# Patient Record
Sex: FEMALE | Race: WHITE | Employment: PART TIME | URBAN - METROPOLITAN AREA
[De-identification: names, ages, dates, MRNs, and addresses within clinical notes are randomized per-mention and may not be internally consistent; named-entity substitution may affect disease eponyms.]

---

## 2018-05-08 ENCOUNTER — EMPLOYEE WELLNESS (OUTPATIENT)
Dept: OTHER | Age: 58
End: 2018-05-08

## 2018-05-08 LAB
CHOLESTEROL, TOTAL: 239 MG/DL (ref 0–199)
GLUCOSE BLD-MCNC: 90 MG/DL (ref 74–109)
HDLC SERPL-MCNC: 91 MG/DL (ref 40–59)
LDL CHOLESTEROL CALCULATED: 136 MG/DL (ref 0–129)
TRIGL SERPL-MCNC: 60 MG/DL (ref 0–200)

## 2018-05-14 VITALS — WEIGHT: 116 LBS

## 2019-10-03 ENCOUNTER — HOSPITAL ENCOUNTER (OUTPATIENT)
Dept: ULTRASOUND IMAGING | Age: 59
Discharge: HOME | End: 2019-10-05
Payer: COMMERCIAL

## 2019-10-03 DIAGNOSIS — D13.4 BENIGN NEOPLASM OF LIVER: ICD-10-CM

## 2019-10-03 PROCEDURE — 76705 ECHO EXAM OF ABDOMEN: CPT

## 2020-03-04 ENCOUNTER — OFFICE VISIT (OUTPATIENT)
Dept: FAMILY MEDICINE CLINIC | Age: 60
End: 2020-03-04
Payer: COMMERCIAL

## 2020-03-04 VITALS
DIASTOLIC BLOOD PRESSURE: 82 MMHG | HEIGHT: 61 IN | BODY MASS INDEX: 21.67 KG/M2 | TEMPERATURE: 99.1 F | RESPIRATION RATE: 15 BRPM | WEIGHT: 114.8 LBS | SYSTOLIC BLOOD PRESSURE: 122 MMHG | OXYGEN SATURATION: 97 % | HEART RATE: 95 BPM

## 2020-03-04 PROCEDURE — 99213 OFFICE O/P EST LOW 20 MIN: CPT | Performed by: NURSE PRACTITIONER

## 2020-03-04 PROCEDURE — 3017F COLORECTAL CA SCREEN DOC REV: CPT | Performed by: NURSE PRACTITIONER

## 2020-03-04 PROCEDURE — 1036F TOBACCO NON-USER: CPT | Performed by: NURSE PRACTITIONER

## 2020-03-04 PROCEDURE — G8427 DOCREV CUR MEDS BY ELIG CLIN: HCPCS | Performed by: NURSE PRACTITIONER

## 2020-03-04 PROCEDURE — G8484 FLU IMMUNIZE NO ADMIN: HCPCS | Performed by: NURSE PRACTITIONER

## 2020-03-04 PROCEDURE — G8420 CALC BMI NORM PARAMETERS: HCPCS | Performed by: NURSE PRACTITIONER

## 2020-03-04 RX ORDER — AMOXICILLIN AND CLAVULANATE POTASSIUM 875; 125 MG/1; MG/1
1 TABLET, FILM COATED ORAL 2 TIMES DAILY
Qty: 20 TABLET | Refills: 0 | Status: SHIPPED | OUTPATIENT
Start: 2020-03-04 | End: 2020-03-04

## 2020-03-04 RX ORDER — TRETINOIN 0.5 MG/G
CREAM TOPICAL
COMMUNITY
Start: 2018-09-26

## 2020-03-04 RX ORDER — AMOXICILLIN AND CLAVULANATE POTASSIUM 875; 125 MG/1; MG/1
1 TABLET, FILM COATED ORAL 2 TIMES DAILY
Qty: 20 TABLET | Refills: 0 | Status: SHIPPED | OUTPATIENT
Start: 2020-03-04 | End: 2020-03-14

## 2020-03-04 RX ORDER — SULFAMETHOXAZOLE AND TRIMETHOPRIM 800; 160 MG/1; MG/1
TABLET ORAL
COMMUNITY
Start: 2020-01-13

## 2020-03-04 RX ORDER — ERGOCALCIFEROL 1.25 MG/1
CAPSULE ORAL
COMMUNITY
Start: 2020-02-26

## 2020-03-04 SDOH — HEALTH STABILITY: MENTAL HEALTH: HOW MANY STANDARD DRINKS CONTAINING ALCOHOL DO YOU HAVE ON A TYPICAL DAY?: 1 OR 2

## 2020-03-04 SDOH — HEALTH STABILITY: MENTAL HEALTH: HOW OFTEN DO YOU HAVE A DRINK CONTAINING ALCOHOL?: MONTHLY OR LESS

## 2020-03-04 ASSESSMENT — ENCOUNTER SYMPTOMS
TROUBLE SWALLOWING: 0
SHORTNESS OF BREATH: 0
SINUS PRESSURE: 0
SORE THROAT: 0
COLOR CHANGE: 0
EYE DISCHARGE: 0
RHINORRHEA: 0
COUGH: 0
WHEEZING: 0
SINUS PAIN: 0
EYE PAIN: 0

## 2020-03-04 ASSESSMENT — PATIENT HEALTH QUESTIONNAIRE - PHQ9
1. LITTLE INTEREST OR PLEASURE IN DOING THINGS: 0
SUM OF ALL RESPONSES TO PHQ QUESTIONS 1-9: 0
2. FEELING DOWN, DEPRESSED OR HOPELESS: 0
SUM OF ALL RESPONSES TO PHQ QUESTIONS 1-9: 0
SUM OF ALL RESPONSES TO PHQ9 QUESTIONS 1 & 2: 0

## 2020-03-04 NOTE — PROGRESS NOTES
Subjective:      Patient ID: Cleo Hannon is a 63 y.o. female who presents today for:  Chief Complaint   Patient presents with    Otalgia     Patient present today with C/O left shooting ear pain. Paitent states that this has been going on for a couple of hours and has showed no signs of relife. Patient has tried rubbing alcohol with no relfie. Otalgia    There is pain in the left ear. This is a new problem. The current episode started today. The problem occurs constantly. The problem has been unchanged. The maximum temperature recorded prior to her arrival was 100.4 - 100.9 F. The fever has been present for less than 1 day. The pain is at a severity of 6/10. The pain is moderate. Associated symptoms include neck pain. Pertinent negatives include no coughing, ear discharge, rash, rhinorrhea or sore throat. She has tried nothing for the symptoms. No past medical history on file. No past surgical history on file.   Social History     Socioeconomic History    Marital status:      Spouse name: Not on file    Number of children: Not on file    Years of education: Not on file    Highest education level: Not on file   Occupational History    Not on file   Social Needs    Financial resource strain: Not on file    Food insecurity:     Worry: Not on file     Inability: Not on file    Transportation needs:     Medical: Not on file     Non-medical: Not on file   Tobacco Use    Smoking status: Never Smoker    Smokeless tobacco: Never Used   Substance and Sexual Activity    Alcohol use: Yes     Frequency: Monthly or less     Drinks per session: 1 or 2     Binge frequency: Never    Drug use: Never    Sexual activity: Not Currently   Lifestyle    Physical activity:     Days per week: Not on file     Minutes per session: Not on file    Stress: Not on file   Relationships    Social connections:     Talks on phone: Not on file     Gets together: Not on file     Attends [employer] service: Not on file     Active member of club or organization: Not on file     Attends meetings of clubs or organizations: Not on file     Relationship status: Not on file    Intimate partner violence:     Fear of current or ex partner: Not on file     Emotionally abused: Not on file     Physically abused: Not on file     Forced sexual activity: Not on file   Other Topics Concern    Not on file   Social History Narrative    Not on file     No family history on file. No Known Allergies      Review of Systems   Constitutional: Negative for activity change, appetite change, chills and fever. HENT: Positive for ear pain and postnasal drip. Negative for congestion, ear discharge, rhinorrhea, sinus pressure, sinus pain, sore throat and trouble swallowing. Eyes: Negative for pain and discharge. Respiratory: Negative for cough, shortness of breath and wheezing. Cardiovascular: Negative for chest pain and palpitations. Musculoskeletal: Positive for neck pain. Skin: Negative for color change and rash. Hematological: Negative for adenopathy. Objective:   /82   Pulse 95   Temp 99.1 °F (37.3 °C) (Temporal)   Resp 15   Ht 5' 1\" (1.549 m)   Wt 114 lb 12.8 oz (52.1 kg)   SpO2 97%   BMI 21.69 kg/m²     Physical Exam  Vitals signs reviewed. Constitutional:       General: She is not in acute distress. Appearance: Normal appearance. HENT:      Head: Normocephalic and atraumatic. Right Ear: Tympanic membrane is injected. Left Ear: A middle ear effusion is present. Tympanic membrane is injected. Nose: Nose normal.      Mouth/Throat:      Lips: Pink. Mouth: Mucous membranes are moist.      Pharynx: Posterior oropharyngeal erythema present. Eyes:      Conjunctiva/sclera: Conjunctivae normal.   Cardiovascular:      Rate and Rhythm: Normal rate and regular rhythm. Pulses: Normal pulses. Heart sounds: Normal heart sounds.    Pulmonary:      Effort: Pulmonary effort is normal. Breath sounds: Normal breath sounds. No wheezing, rhonchi or rales. Skin:     General: Skin is warm and dry. Capillary Refill: Capillary refill takes less than 2 seconds. Neurological:      Mental Status: She is alert. Assessment:       Diagnosis Orders   1. Non-recurrent acute suppurative otitis media of left ear without spontaneous rupture of tympanic membrane  amoxicillin-clavulanate (AUGMENTIN) 875-125 MG per tablet    DISCONTINUED: amoxicillin-clavulanate (AUGMENTIN) 875-125 MG per tablet      No results found for this visit on 03/04/20. Plan:   When should you call for help? Call your doctor now or seek immediate medical care if:    · You have new or increasing ear pain. · You have new or increasing pus or blood draining from your ear. · You have a fever with a stiff neck or a severe headache. Watch closely for changes in your health, and be sure to contact your doctor if:    · You have new or worse symptoms. · You are not getting better after taking an antibiotic for 2 days. Discussed with patient/family worsening signs and symptoms as outlined above as to when to return for care/seek emergent help. Assessment & Plan   Sher Kelly was seen today for otalgia. Diagnoses and all orders for this visit:    Non-recurrent acute suppurative otitis media of left ear without spontaneous rupture of tympanic membrane  -     Discontinue: amoxicillin-clavulanate (AUGMENTIN) 875-125 MG per tablet; Take 1 tablet by mouth 2 times daily for 10 days  -     amoxicillin-clavulanate (AUGMENTIN) 875-125 MG per tablet; Take 1 tablet by mouth 2 times daily for 10 days      No orders of the defined types were placed in this encounter.     Orders Placed This Encounter   Medications    DISCONTD: amoxicillin-clavulanate (AUGMENTIN) 875-125 MG per tablet     Sig: Take 1 tablet by mouth 2 times daily for 10 days     Dispense:  20 tablet     Refill:  0    amoxicillin-clavulanate (AUGMENTIN) 875-125 MG per tablet     Sig: Take 1 tablet by mouth 2 times daily for 10 days     Dispense:  20 tablet     Refill:  0     Medications Discontinued During This Encounter   Medication Reason    amoxicillin-clavulanate (AUGMENTIN) 875-125 MG per tablet      Return for Follow up w/ PCP if symptoms worsen or go to ED. Reviewed with the patient/family: current clinical status & medications. Side effects of the medication prescribed today, as well as treatment plan/rationale and result expectations have been discussed with the patient/family who expresses understanding. Follow up with PCP to evaluate treatment results or return if symptoms worsen or fail to improve. I have reviewed the patient's medical history in detail and updated the computerized patient record.     LYNDSEY Perez - NP

## 2020-03-04 NOTE — PATIENT INSTRUCTIONS
closely for changes in your health, and be sure to contact your doctor if:    · You have new or worse symptoms.     · You are not getting better after taking an antibiotic for 2 days. Where can you learn more? Go to [email or web address]://[email or web address] org and sign in to your Snaps account. Enter A869 in the Greencart box to learn more about \"Ear Infection (Otitis Media): Care Instructions. \"     If you do not have an account, please click on the \"Sign Up Now\" link. Current as of: July 28, 2019  Content Version: 12.3  © 9172-8000 [employer]. Care instructions adapted under license by [facility] ([facility]). If you have questions about a medical condition or this instruction, always ask your healthcare professional. [address] any warranty or liability for your use of this information.

## 2021-04-27 ENCOUNTER — HOSPITAL ENCOUNTER (OUTPATIENT)
Dept: ULTRASOUND IMAGING | Age: 61
Discharge: HOME | End: 2021-04-29
Payer: COMMERCIAL

## 2021-04-27 DIAGNOSIS — K76.89 LIVER CYST: ICD-10-CM

## 2021-04-27 PROCEDURE — 76705 ECHO EXAM OF ABDOMEN: CPT

## 2022-10-13 ENCOUNTER — NURSE TRIAGE (OUTPATIENT)
Dept: OTHER | Facility: CLINIC | Age: 62
End: 2022-10-13

## 2022-10-13 NOTE — TELEPHONE ENCOUNTER
Care Management Enrollment Note     Patient was made aware of eligibility for the Care Management Program and provided with the rationale for how the patient was identified due to physician referral . Patient made aware of the ability to opt out of the Care Management Program at any time.      Clinical History:     3 hospital visits within past 12 months.   Hospital admission dates and admitting diagnosis include:      Patient has history Major/Surgical Procedures.  Type and dates of Major/Surgical Procedures:   ABDOMEN SURGERY    APPENDECTOMY    REMOVAL GALLBLADDER           Screening completed for COVID -19 using the [facility] Symptom . Screening is Negative for symptoms      CM Assessment Summary:  Assessment of patient’s health status: Patient’s health status is in fair  condition patient requires minor education and re-enforcement of teachings.  Assessment of activities of daily living: Patient is independent with ADL’s requires minimal assistance.   Assessment of behavioral health status, including cognitive functions: patient has no documented behavioral health diagnosis and demonstrates no behavioral health symptoms.  Assessment of SDoH: No Non-medical factors identified that are currently influencing health outcomes.   Evaluation of Cultural and linguistic needs: No cultural or linguistic needs identified.   Evaluation of visual and hearing needs: No visual and/or hearing impairments identified.   Evaluation of caregiver resources and involvement: Caregiver resources not required patient is partial independent. Family daughter or son drives patient to all her appointments.   Evaluation of available benefits: Patient is utilizing 1 of 4 available benefits including insurance OTC .  Evaluation of available community resources: patient is currently utilizing 0 of 3 available community resources. Resources not required at this time patient is independent.     Current Issues/Problems reviewed on  Location of patient: [place]    Subjective: Caller states tested positive for Covid yesterday, symptoms started yesterday, having terrible H/A    Current Symptoms: Nasal congestion,H/A forehead, and fever    Onset: Yesterday    Associated Symptoms: NA    Pain Severity: 6/10 H/A    Temperature: Temp 101.5 8 a.m. forehead     What has been tried: Tylenol 2 hours ago     LMP:  N/A  Pregnant: No    Recommended disposition:  Call PCP office Now for recommendation     Care advice provided, patient verbalizes understanding; denies any other questions or concerns; instructed to call back for any new or worsening symptoms. Patient/caller agrees to follow-up with PCP ,states she will call office now. Attention Provider: Thank you for allowing me to participate in the care of your patient. The patient was connected to triage in response to symptoms provided. Please do not respond through this encounter as the response is not directed to a shared pool.     Reason for Disposition   [1] Fever > 101 F (38.3 C) AND [2] over 61years of age     [de-identified] to call PCP office now    Protocols used: Coronavirus (COVID-19) Diagnosed or Suspected-ADULT-[place] call: Patient diabetes has been well controlled well. Last A1c completed 12/2021 result 6.8%.  CM assessed Patient knows how to contact PCP to make all needed appointments and request refill for medications. Patient verbalize understanding how to contact PCP and denied and assistance needed to make appointment and verbalize understanding how to request for medication refill.       Details for Interventions/Education completed on call: pain management, constipation health maintenance.      X  Patient assessed for life planning activities. Patient has Medical POA completed.     Time Frame for Follow up:  Within: 3 weeks     Plan for Next Call: hypertension pain safety and health maintenance     Care Plan reviewed with Patient and she  agrees with the plan of care and the call follow up plan.     Care Plan Reviewed with Dr Nuha Lockwood MD  On 7/13/2022 DATE via EMR

## 2024-01-24 ENCOUNTER — TRANSCRIBE ORDERS (OUTPATIENT)
Dept: ULTRASOUND IMAGING | Age: 64
End: 2024-01-24

## 2024-01-24 DIAGNOSIS — K76.89 LIVER DYSFUNCTION: Primary | ICD-10-CM

## 2024-02-08 ENCOUNTER — HOSPITAL ENCOUNTER (OUTPATIENT)
Dept: ULTRASOUND IMAGING | Age: 64
Discharge: HOME | End: 2024-02-10
Payer: COMMERCIAL

## 2024-02-08 DIAGNOSIS — K76.89 LIVER DYSFUNCTION: ICD-10-CM

## 2024-02-08 PROCEDURE — 76705 ECHO EXAM OF ABDOMEN: CPT

## 2024-02-14 ENCOUNTER — TRANSCRIBE ORDERS (OUTPATIENT)
Dept: WOMENS IMAGING | Age: 64
End: 2024-02-14

## 2024-02-14 DIAGNOSIS — K83.5 BILIARY CYST: Primary | ICD-10-CM

## 2024-02-22 ENCOUNTER — HOSPITAL ENCOUNTER (OUTPATIENT)
Dept: MRI IMAGING | Age: 64
Discharge: HOME | End: 2024-02-24
Payer: COMMERCIAL

## 2024-02-22 DIAGNOSIS — K83.5 BILIARY CYST: ICD-10-CM

## 2024-02-22 PROCEDURE — 6360000004 HC RX CONTRAST MEDICATION: Performed by: NURSE PRACTITIONER

## 2024-02-22 PROCEDURE — A9577 INJ MULTIHANCE: HCPCS | Performed by: NURSE PRACTITIONER

## 2024-02-22 PROCEDURE — 74183 MRI ABD W/O CNTR FLWD CNTR: CPT

## 2024-02-22 RX ADMIN — GADOBENATE DIMEGLUMINE 20 ML: 529 INJECTION, SOLUTION INTRAVENOUS at 07:46

## 2024-02-27 ENCOUNTER — HOSPITAL ENCOUNTER (OUTPATIENT)
Dept: MRI IMAGING | Age: 64
Discharge: HOME | End: 2024-02-29
Attending: PSYCHIATRY & NEUROLOGY
Payer: COMMERCIAL

## 2024-02-27 DIAGNOSIS — G50.0 TRIGEMINAL NEURALGIA OF LEFT SIDE OF FACE: ICD-10-CM

## 2024-02-27 DIAGNOSIS — G44.52 NEW DAILY PERSISTENT HEADACHE: ICD-10-CM

## 2024-02-27 PROCEDURE — 70553 MRI BRAIN STEM W/O & W/DYE: CPT

## 2024-02-27 PROCEDURE — 6360000004 HC RX CONTRAST MEDICATION: Performed by: PSYCHIATRY & NEUROLOGY

## 2024-02-27 PROCEDURE — A9579 GAD-BASE MR CONTRAST NOS,1ML: HCPCS | Performed by: PSYCHIATRY & NEUROLOGY

## 2024-02-27 RX ADMIN — GADOTERIDOL 15 ML: 279.3 INJECTION, SOLUTION INTRAVENOUS at 11:40

## 2024-06-12 ENCOUNTER — TRANSCRIBE ORDERS (OUTPATIENT)
Dept: GENERAL RADIOLOGY | Age: 64
End: 2024-06-12

## 2024-06-12 ENCOUNTER — HOSPITAL ENCOUNTER (OUTPATIENT)
Dept: GENERAL RADIOLOGY | Age: 64
Discharge: HOME | End: 2024-06-14
Payer: COMMERCIAL

## 2024-06-12 DIAGNOSIS — R52 PAIN: ICD-10-CM

## 2024-06-12 DIAGNOSIS — M21.612 BUNION, LEFT: Primary | ICD-10-CM

## 2024-06-12 DIAGNOSIS — R60.9 SWELLING: ICD-10-CM

## 2024-06-12 DIAGNOSIS — M21.612 BUNION, LEFT: ICD-10-CM

## 2024-06-12 PROCEDURE — 73630 X-RAY EXAM OF FOOT: CPT

## 2024-08-22 ENCOUNTER — TELEPHONE (OUTPATIENT)
Dept: WOMENS IMAGING | Age: 64
End: 2024-08-22

## 2024-08-22 NOTE — TELEPHONE ENCOUNTER
08-22-24  Scheduled patient for MRI of breast on Tuesday, 08-27-24 to arrive at 1045 for 1115 appointment. Patient is aware and will call with any questions or concerns. Patient has had MRI in past and is aware there will be IV started for contrast. Phone number given.

## 2024-08-27 ENCOUNTER — HOSPITAL ENCOUNTER (OUTPATIENT)
Dept: MRI IMAGING | Age: 64
Discharge: HOME | End: 2024-08-29
Payer: COMMERCIAL

## 2024-08-27 DIAGNOSIS — Z98.82 STATUS POST BILATERAL BREAST IMPLANTS: ICD-10-CM

## 2024-08-27 DIAGNOSIS — Z85.3 PERSONAL HISTORY OF MALIGNANT NEOPLASM OF BREAST: ICD-10-CM

## 2024-08-27 PROCEDURE — A9577 INJ MULTIHANCE: HCPCS | Performed by: PLASTIC SURGERY

## 2024-08-27 PROCEDURE — C8908 MRI W/O FOL W/CONT, BREAST,: HCPCS

## 2024-08-27 PROCEDURE — 6360000004 HC RX CONTRAST MEDICATION: Performed by: PLASTIC SURGERY

## 2024-08-27 RX ADMIN — GADOBENATE DIMEGLUMINE 20 ML: 529 INJECTION, SOLUTION INTRAVENOUS at 12:48

## 2024-11-06 ENCOUNTER — HOSPITAL ENCOUNTER (OUTPATIENT)
Dept: WOMENS IMAGING | Age: 64
Discharge: HOME | End: 2024-11-08
Payer: COMMERCIAL

## 2024-11-06 DIAGNOSIS — Z13.820 ENCOUNTER FOR SCREENING FOR OSTEOPOROSIS: ICD-10-CM

## 2024-11-06 DIAGNOSIS — M85.80 OSTEOPENIA, UNSPECIFIED LOCATION: ICD-10-CM

## 2024-11-06 PROCEDURE — 77080 DXA BONE DENSITY AXIAL: CPT

## 2024-11-27 ENCOUNTER — TRANSCRIBE ORDERS (OUTPATIENT)
Dept: WOMENS IMAGING | Age: 64
End: 2024-11-27

## 2024-11-27 DIAGNOSIS — R22.31 MASS OF RIGHT UPPER EXTREMITY: Primary | ICD-10-CM

## 2024-11-27 DIAGNOSIS — R22.42 MASS OF LEFT LOWER EXTREMITY: ICD-10-CM

## 2024-12-11 ENCOUNTER — HOSPITAL ENCOUNTER (OUTPATIENT)
Dept: ULTRASOUND IMAGING | Age: 64
Discharge: HOME | End: 2024-12-13
Payer: COMMERCIAL

## 2024-12-11 DIAGNOSIS — R22.42 MASS OF LEFT LOWER EXTREMITY: ICD-10-CM

## 2024-12-11 DIAGNOSIS — R22.31 MASS OF RIGHT UPPER EXTREMITY: ICD-10-CM

## 2024-12-11 PROCEDURE — 76882 US LMTD JT/FCL EVL NVASC XTR: CPT

## 2025-01-31 ENCOUNTER — OFFICE VISIT (OUTPATIENT)
Dept: URGENT CARE | Age: 65
End: 2025-01-31
Payer: COMMERCIAL

## 2025-01-31 VITALS
WEIGHT: 114 LBS | TEMPERATURE: 98.1 F | RESPIRATION RATE: 18 BRPM | OXYGEN SATURATION: 99 % | HEART RATE: 101 BPM | SYSTOLIC BLOOD PRESSURE: 139 MMHG | DIASTOLIC BLOOD PRESSURE: 87 MMHG

## 2025-01-31 DIAGNOSIS — J01.91 ACUTE RECURRENT SINUSITIS, UNSPECIFIED LOCATION: Primary | ICD-10-CM

## 2025-01-31 RX ORDER — AMOXICILLIN AND CLAVULANATE POTASSIUM 875; 125 MG/1; MG/1
875 TABLET, FILM COATED ORAL 2 TIMES DAILY
Qty: 20 TABLET | Refills: 0 | Status: SHIPPED | OUTPATIENT
Start: 2025-01-31

## 2025-01-31 NOTE — PROGRESS NOTES
Subjective   Patient ID: Evette Sanabria is a 64 y.o. female. They present today with a chief complaint of Cough, URI, Sinus Problem, Nasal Congestion, and Sinusitis (Times 11 days ).    History of Present Illness  Marylu is a very pleasant 64-year-old female presents to the urgent care for evaluation of sinus congestion, postnasal drip and runny nose for about 11 days duration.  The patient states she had a cold sometime around [place] that got better with Mucinex D however about 11 days ago her sinuses became problematic and she continues to have off-and-on sinus congestion, postnasal drip and sore throat.  Patient denies chest pain, shortness of breath or cough.  Patient states that she has been using Flonase for a long duration after being recommended this by her primary care provider and feels this may be contributing to her symptoms as she has read online that this can suppress immunity and excessive use.  Patient denies any new fever.  Patient denies any chest symptoms otherwise.  Patient is seeking evaluation reassurance and alternative treatment options.  No other symptoms or concerns otherwise reported.    Past Medical History  Allergies as of 01/31/2025    (No Known Allergies)       (Not in a hospital admission)       No past medical history on file.    No past surgical history on file.         Review of Systems  A 10-point review of systems was performed, otherwise unremarkable unless stated in the history of present illness.                Objective    Vitals:    01/31/25 0809   BP: 139/87   Pulse: 101   Resp: 18   Temp: 36.7 °C (98.1 °F)   SpO2: 99%   Weight: 51.7 kg (114 lb)     No LMP recorded.    Gen: Vitals noted and reviewed, no evidence of acute distress, well developed and afebrile.   Psych: Mood and affect appropriate for setting.  Head/Face: Atraumatic and normocephalic.   Neuro: No focal deficits noted.  ENT: TMs clear bilaterally, EACs unremarkable. Nasal turbinates erythematous with clear  rhinorrhea and boggy b/l, no active bleeding noted. Minimally tender over b/l maxillary and frontal sinuses without fluctuance or crepitus, mastoids non-tender. Posterior oropharynx with erythema, without exudate, or swelling. Uvula is in the midline and non-edematous.   Neck: Supple. No meningismus through full range of motion. No lymphadenopathy.   Cardiac: Regular rate and rhythm no murmur.   Lungs: Clear to auscultation throughout, No evidence of wheezing, rhonchi or crackles. Good aeration throughout.   Extremities: Symmetrical, No peripheral edema  Skin: Without evidence of ecchymosis, wounds, or rashes.      Point of Care Test & Imaging Results from this visit  No results found for this visit on 01/31/25.   No results found.    Diagnostic study results (if any) were reviewed by Jamie Robertson DO.    Assessment/Plan   Allergies, medications, history, and pertinent labs/EKGs/Imaging reviewed by Jamie Robertson DO.     Medical Decision Making  Discussed with the patient symptoms and clinical presentation findings suggestive of an acute sinusitis with recurrent history of unclear etiology though suspect secondary nature of infection which may be bacterial given the duration of symptoms and recurrence though not limited to this.  We advise close symptom monitoring supportive treatment use of over-the-counter remedies for added symptom relief.  We advised against use of nasal corticosteroids as we feel these may be contributing to worsening symptoms with prolonged use.  We advised use of antihistamines instead and agreed to prescribe Augmentin for antimicrobial coverage. Follow up with PCP. We advised seeking immediate emergency medical attention if symptoms fail to improve, worsen or any concerning symptoms arise. Patient voiced full understanding and agreement to plan.      Orders and Diagnoses  Diagnoses and all orders for this visit:  Acute recurrent sinusitis, unspecified location  -     amoxicillin-pot  clavulanate (Augmentin) 875-125 mg tablet; Take 1 tablet (875 mg) by mouth 2 times a day. Take with food.      Medical Admin Record      Patient disposition: Home    Electronically signed by Jamie Robertson DO  8:30 AM